# Patient Record
(demographics unavailable — no encounter records)

---

## 2024-10-23 NOTE — ASSESSMENT
[FreeTextEntry1] : 4o yo woman generally in good health presenting to establish care  #Dysmennorhea - Check CBC and iron studies - follows with GYN  #obesity - diet and excercise advised - patient agreeable and will try her best  #HCM - Mammogram 3month ago: normal as per pt report - PAP +HPV testing May 2024: -ve - Flu vaccine given 10/23/2024 - RTC in 3 months with labs

## 2024-10-23 NOTE — PHYSICAL EXAM
[No Acute Distress] : no acute distress [Normal Sclera/Conjunctiva] : normal sclera/conjunctiva [EOMI] : extraocular movements intact [Normal Oropharynx] : the oropharynx was normal [No JVD] : no jugular venous distention [Supple] : supple [Thyroid Normal, No Nodules] : the thyroid was normal and there were no nodules present [Normal Rate] : normal rate  [Regular Rhythm] : with a regular rhythm [Pedal Pulses Present] : the pedal pulses are present [No Edema] : there was no peripheral edema [Soft] : abdomen soft [Non Tender] : non-tender [Non-distended] : non-distended [No Rash] : no rash [No Focal Deficits] : no focal deficits

## 2024-10-23 NOTE — HISTORY OF PRESENT ILLNESS
[FreeTextEntry1] : establish care [de-identified] : 41 yo woman with hx of dysmenorrhea presenting to establish care. She feels generally well. No complaints. She requests blood test for her thyroid because her mother has goiter.

## 2024-12-07 NOTE — HISTORY OF PRESENT ILLNESS
[FreeTextEntry1] : 41yo for f/u of results, eval for pelvic pain, heavy painful menses sono shows adenomyosis, normal ovaries we discussed what adenomyosis is and management options pt has h/o tubal ligation, she desires pregnancy, understands she needs [facility] to get pregnant she did not go for blood work

## 2024-12-07 NOTE — DISCUSSION/SUMMARY
[FreeTextEntry1] : 41yo, adenomyosis, desiring pregnancy, h/o BTL -has tried ibuprofen with menses, which helps a little -dsicussed taking OCP to reduce pain/bleeding with menses, rx sent -info given for [facility] offices -go for bloodwork
